# Patient Record
Sex: MALE | Race: WHITE | NOT HISPANIC OR LATINO | URBAN - METROPOLITAN AREA
[De-identification: names, ages, dates, MRNs, and addresses within clinical notes are randomized per-mention and may not be internally consistent; named-entity substitution may affect disease eponyms.]

---

## 2020-06-24 ENCOUNTER — OFFICE VISIT (OUTPATIENT)
Dept: URBAN - METROPOLITAN AREA CLINIC 113 | Facility: CLINIC | Age: 35
End: 2020-06-24

## 2020-07-25 ENCOUNTER — TELEPHONE ENCOUNTER (OUTPATIENT)
Dept: URBAN - METROPOLITAN AREA CLINIC 13 | Facility: CLINIC | Age: 35
End: 2020-07-25

## 2020-07-26 ENCOUNTER — TELEPHONE ENCOUNTER (OUTPATIENT)
Dept: URBAN - METROPOLITAN AREA CLINIC 13 | Facility: CLINIC | Age: 35
End: 2020-07-26

## 2020-07-26 RX ORDER — MINOCYCLINE HYDROCHLORIDE 100 MG/1
TAKE 1 CAPSULE DAILY WITH FOOD CAPSULE ORAL
Refills: 0 | Status: ACTIVE | COMMUNITY
Start: 2020-06-08

## 2020-07-26 RX ORDER — ACETAMINOPHEN AND CODEINE PHOSPHATE 300; 30 MG/1; MG/1
TABLET ORAL
Qty: 20 | Refills: 0 | Status: ACTIVE | COMMUNITY
Start: 2020-04-20

## 2020-07-26 RX ORDER — PROMETHAZINE HYDROCHLORIDE 25 MG/1
TAKE ONE TABLET BY MOUTH THREE TIMES DAILY AS NEEDED FOR NAUSEA TABLET ORAL
Qty: 20 | Refills: 0 | Status: ACTIVE | COMMUNITY
Start: 2019-12-19

## 2020-07-26 RX ORDER — MELOXICAM 15 MG/1
TAKE 1 TABLET DAILY AS NEEDED TABLET ORAL
Refills: 0 | Status: ACTIVE | COMMUNITY
Start: 2020-06-08

## 2020-07-26 RX ORDER — AMOXICILLIN AND CLAVULANATE POTASSIUM 875; 125 MG/1; MG/1
TABLET, FILM COATED ORAL
Qty: 14 | Refills: 0 | Status: ACTIVE | COMMUNITY
Start: 2020-03-28

## 2020-07-26 RX ORDER — BUPRENORPHINE 8 MG/1
DISSOLVE 3 TABLETS UNDER TONGUE DAILY TABLET SUBLINGUAL
Qty: 84 | Refills: 0 | Status: ACTIVE | COMMUNITY
Start: 2019-11-21

## 2020-07-26 RX ORDER — GABAPENTIN 400 MG/1
TAKE ONE CAPSULE BY MOUTH THREE TIMES DAILY CAPSULE ORAL
Qty: 84 | Refills: 0 | Status: ACTIVE | COMMUNITY
Start: 2019-07-03

## 2020-07-26 RX ORDER — GLECAPREVIR AND PIBRENTASVIR 40; 100 MG/1; MG/1
TAKE 3 TABLET DAILY TABLET, FILM COATED ORAL
Qty: 84 | Refills: 2 | Status: ACTIVE | COMMUNITY
Start: 2020-07-23

## 2020-07-26 RX ORDER — PREDNISONE 20 MG/1
TABLET ORAL
Qty: 10 | Refills: 0 | Status: ACTIVE | COMMUNITY
Start: 2019-12-19

## 2020-07-26 RX ORDER — AZITHROMYCIN DIHYDRATE 250 MG/1
TAKE 2 TABLETS BY MOUTH ON DAY 1, THEN TAKE 1 TABLET DAILY ON DAYS 2-5 TABLET, FILM COATED ORAL
Qty: 6 | Refills: 0 | Status: ACTIVE | COMMUNITY
Start: 2019-12-19

## 2020-07-26 RX ORDER — GABAPENTIN 600 MG/1
TAKE 1 TABLET TWICE DAILY TABLET, FILM COATED ORAL
Qty: 180 | Refills: 2 | Status: ACTIVE | COMMUNITY
Start: 2020-02-14

## 2020-07-26 RX ORDER — CIPROFLOXACIN 3 MG/ML
SOLUTION OPHTHALMIC
Qty: 5 | Refills: 0 | Status: ACTIVE | COMMUNITY
Start: 2020-05-11

## 2020-07-26 RX ORDER — HEPATITIS A AND HEPATITIS B (RECOMBINANT) VACCINE 720; 20 [IU]/ML; UG/ML
INJECT 1 ML OTHER 0, 2, AND 6 MONTHS INJECTION, SUSPENSION INTRAMUSCULAR
Qty: 3 | Refills: 0 | Status: ACTIVE | COMMUNITY
Start: 2020-07-28

## 2020-08-12 ENCOUNTER — TELEPHONE ENCOUNTER (OUTPATIENT)
Dept: URBAN - METROPOLITAN AREA CLINIC 113 | Facility: CLINIC | Age: 35
End: 2020-08-12

## 2020-08-12 RX ORDER — VELPATASVIR AND SOFOSBUVIR 100; 400 MG/1; MG/1
1 TABLET TABLET, FILM COATED ORAL ONCE A DAY
Qty: 90 | Refills: 0 | OUTPATIENT
Start: 2020-08-12 | End: 2020-11-09

## 2020-08-27 ENCOUNTER — LAB OUTSIDE AN ENCOUNTER (OUTPATIENT)
Dept: URBAN - METROPOLITAN AREA CLINIC 113 | Facility: CLINIC | Age: 35
End: 2020-08-27

## 2020-08-27 ENCOUNTER — TELEPHONE ENCOUNTER (OUTPATIENT)
Dept: URBAN - METROPOLITAN AREA CLINIC 113 | Facility: CLINIC | Age: 35
End: 2020-08-27

## 2020-09-08 ENCOUNTER — TELEPHONE ENCOUNTER (OUTPATIENT)
Dept: URBAN - METROPOLITAN AREA CLINIC 113 | Facility: CLINIC | Age: 35
End: 2020-09-08

## 2020-09-08 RX ORDER — HEPATITIS A AND HEPATITIS B (RECOMBINANT) VACCINE 720; 20 [IU]/ML; UG/ML
INJECT 1 ML INJECTION, SUSPENSION INTRAMUSCULAR
Qty: 3 | Refills: 0
Start: 2020-07-28 | End: 2021-04-30

## 2020-09-18 ENCOUNTER — TELEPHONE ENCOUNTER (OUTPATIENT)
Dept: URBAN - METROPOLITAN AREA CLINIC 113 | Facility: CLINIC | Age: 35
End: 2020-09-18

## 2020-09-24 ENCOUNTER — LAB OUTSIDE AN ENCOUNTER (OUTPATIENT)
Dept: URBAN - METROPOLITAN AREA CLINIC 113 | Facility: CLINIC | Age: 35
End: 2020-09-24

## 2020-09-28 ENCOUNTER — DASHBOARD ENCOUNTERS (OUTPATIENT)
Age: 35
End: 2020-09-28

## 2020-09-28 ENCOUNTER — WEB ENCOUNTER (OUTPATIENT)
Dept: URBAN - METROPOLITAN AREA CLINIC 113 | Facility: CLINIC | Age: 35
End: 2020-09-28

## 2020-09-28 ENCOUNTER — OFFICE VISIT (OUTPATIENT)
Dept: URBAN - METROPOLITAN AREA CLINIC 113 | Facility: CLINIC | Age: 35
End: 2020-09-28
Payer: SELF-PAY

## 2020-09-28 VITALS
DIASTOLIC BLOOD PRESSURE: 85 MMHG | HEART RATE: 76 BPM | HEIGHT: 75 IN | SYSTOLIC BLOOD PRESSURE: 115 MMHG | BODY MASS INDEX: 29.22 KG/M2 | RESPIRATION RATE: 20 BRPM | WEIGHT: 235 LBS | TEMPERATURE: 98.2 F

## 2020-09-28 DIAGNOSIS — R74.8 ABNORMAL AST AND ALT: ICD-10-CM

## 2020-09-28 DIAGNOSIS — B18.2 CHRONIC HEPATITIS C VIRUS GENOTYPE 3 INFECTION: ICD-10-CM

## 2020-09-28 PROBLEM — 365767001: Status: ACTIVE | Noted: 2020-09-28

## 2020-09-28 PROBLEM — 768006009: Status: ACTIVE | Noted: 2020-09-28

## 2020-09-28 PROCEDURE — 1036F TOBACCO NON-USER: CPT | Performed by: INTERNAL MEDICINE

## 2020-09-28 PROCEDURE — G8427 DOCREV CUR MEDS BY ELIG CLIN: HCPCS | Performed by: INTERNAL MEDICINE

## 2020-09-28 PROCEDURE — G9903 PT SCRN TBCO ID AS NON USER: HCPCS | Performed by: INTERNAL MEDICINE

## 2020-09-28 PROCEDURE — G8417 CALC BMI ABV UP PARAM F/U: HCPCS | Performed by: INTERNAL MEDICINE

## 2020-09-28 PROCEDURE — 99214 OFFICE O/P EST MOD 30 MIN: CPT | Performed by: INTERNAL MEDICINE

## 2020-09-28 RX ORDER — ACETAMINOPHEN AND CODEINE PHOSPHATE 300; 30 MG/1; MG/1
TABLET ORAL
Qty: 20 | Refills: 0 | Status: DISCONTINUED | COMMUNITY
Start: 2020-04-20

## 2020-09-28 RX ORDER — PREDNISONE 20 MG/1
TABLET ORAL
Qty: 10 | Refills: 0 | Status: DISCONTINUED | COMMUNITY
Start: 2019-12-19

## 2020-09-28 RX ORDER — CIPROFLOXACIN 3 MG/ML
SOLUTION OPHTHALMIC
Qty: 5 | Refills: 0 | Status: DISCONTINUED | COMMUNITY
Start: 2020-05-11

## 2020-09-28 RX ORDER — MELOXICAM 15 MG/1
TAKE 1 TABLET DAILY AS NEEDED TABLET ORAL
Refills: 0 | Status: DISCONTINUED | COMMUNITY
Start: 2020-06-08

## 2020-09-28 RX ORDER — AMOXICILLIN AND CLAVULANATE POTASSIUM 875; 125 MG/1; MG/1
TABLET, FILM COATED ORAL
Qty: 14 | Refills: 0 | Status: DISCONTINUED | COMMUNITY
Start: 2020-03-28

## 2020-09-28 RX ORDER — GLECAPREVIR AND PIBRENTASVIR 40; 100 MG/1; MG/1
TAKE 3 TABLET DAILY TABLET, FILM COATED ORAL
Qty: 84 | Refills: 2 | Status: DISCONTINUED | COMMUNITY
Start: 2020-07-23

## 2020-09-28 RX ORDER — SOFOSBUVIR, VELPATASVIR, AND VOXILAPREVIR 400; 100; 100 MG/1; MG/1; MG/1
1 TABLET WITH FOOD TABLET, FILM COATED ORAL ONCE A DAY
Status: ACTIVE | COMMUNITY

## 2020-09-28 RX ORDER — HEPATITIS A AND HEPATITIS B (RECOMBINANT) VACCINE 720; 20 [IU]/ML; UG/ML
INJECT 1 ML INJECTION, SUSPENSION INTRAMUSCULAR
Qty: 3 | Refills: 0 | Status: DISCONTINUED | COMMUNITY
Start: 2020-07-28 | End: 2021-04-30

## 2020-09-28 RX ORDER — BUPRENORPHINE 8 MG/1
DISSOLVE 3 TABLETS UNDER TONGUE DAILY TABLET SUBLINGUAL
Qty: 84 | Refills: 0 | Status: ACTIVE | COMMUNITY
Start: 2019-11-21

## 2020-09-28 RX ORDER — HYDROXYZINE HYDROCHLORIDE 25 MG/1
1 TABLET AS NEEDED TABLET, FILM COATED ORAL
Status: ACTIVE | COMMUNITY

## 2020-09-28 RX ORDER — MINOCYCLINE HYDROCHLORIDE 100 MG/1
TAKE 1 CAPSULE DAILY WITH FOOD CAPSULE ORAL
Refills: 0 | Status: DISCONTINUED | COMMUNITY
Start: 2020-06-08

## 2020-09-28 RX ORDER — VELPATASVIR AND SOFOSBUVIR 100; 400 MG/1; MG/1
1 TABLET TABLET, FILM COATED ORAL ONCE A DAY
Qty: 90 | Refills: 0 | Status: DISCONTINUED | COMMUNITY
Start: 2020-08-12 | End: 2020-11-09

## 2020-09-28 RX ORDER — PROMETHAZINE HYDROCHLORIDE 25 MG/1
TAKE ONE TABLET BY MOUTH THREE TIMES DAILY AS NEEDED FOR NAUSEA TABLET ORAL
Qty: 20 | Refills: 0 | Status: DISCONTINUED | COMMUNITY
Start: 2019-12-19

## 2020-09-28 RX ORDER — GABAPENTIN 400 MG/1
TAKE ONE CAPSULE BY MOUTH THREE TIMES DAILY CAPSULE ORAL
Qty: 84 | Refills: 0 | Status: DISCONTINUED | COMMUNITY
Start: 2019-07-03

## 2020-09-28 RX ORDER — AZITHROMYCIN DIHYDRATE 250 MG/1
TAKE 2 TABLETS BY MOUTH ON DAY 1, THEN TAKE 1 TABLET DAILY ON DAYS 2-5 TABLET, FILM COATED ORAL
Qty: 6 | Refills: 0 | Status: DISCONTINUED | COMMUNITY
Start: 2019-12-19

## 2020-09-28 RX ORDER — GABAPENTIN 600 MG/1
TAKE 1 TABLET TWICE DAILY TABLET, FILM COATED ORAL
Qty: 180 | Refills: 2 | Status: ACTIVE | COMMUNITY
Start: 2020-02-14

## 2020-09-28 NOTE — HPI-TODAY'S VISIT:
He was prescribed Vosevil for 12 weeks which he started today.  He denies any new GI complaints and is tolerating the medication so far.  He denies alcohol use, tobacco or drug use.  Abdominal ultrasound 6/30/2020 revealed a normal liver, normal gallbladder, common bile duct normal at 3 mm

## 2020-09-28 NOTE — HPI-OTHER HISTORIES
Mr. Hernández is a 34-year-old male with history of hepatitis C treatment experienced initially referred for recurrent hepatitis C. He was iniitally seen June 24th.    He was treated while incarcerated 2 years ago for around 12 weeks. They did not tell him which medications he was given, but he states he was taking 1 pill once a day for 12 weeks. He currently denies abdominal pain, change in bowel habits, jaundice, nausea, vomiting, weight loss or blood in the stool. He does have a history of IV illicit opiate use and homemade tattoos. No family history of colon cancer, inflammatory bowel disease GI cancers, peptic ulcer disease or chronic liver disease. He believes he had an ultrasound a few years ago. He quit smoking earlier this year and does not drink alcohol any longer.   Labs 6/19/20 : CBC 5.1, hemoglobin 14.6, MCV 83, platelets 190; BUN 12, creatinine 0.9, AST 51, , alkaline phosphatase 63, T bili 1.7, albumin 4.6; hepatitis C antibody reactive, hepatitis C RNA ,000 iu/ml  Labs 6/29/2020 hepatitis C genotype 3

## 2020-10-28 LAB
A/G RATIO: 1.5
ALBUMIN: 4.4
ALKALINE PHOSPHATASE: 70
AST (SGOT): 19
BASO (ABSOLUTE): 0
BASOS: 0
BILIRUBIN, TOTAL: 1.3
BUN/CREATININE RATIO: 11
BUN: 10
CALCIUM: 9.5
CARBON DIOXIDE, TOTAL: 23
CHLORIDE: 103
CREATININE: 0.89
EGFR IF AFRICN AM: 128
EGFR IF NONAFRICN AM: 111
EOS (ABSOLUTE): 0.3
EOS: 5
GLOBULIN, TOTAL: 2.9
GLUCOSE: 98
HEMATOCRIT: 40.8
HEMATOLOGY COMMENTS:: (no result)
HEMOGLOBIN: 14
IMMATURE CELLS: (no result)
IMMATURE GRANS (ABS): 0
IMMATURE GRANULOCYTES: 0
LYMPHS (ABSOLUTE): 1.9
LYMPHS: 32
MCH: 28.2
MCHC: 34.3
MCV: 82
MONOCYTES(ABSOLUTE): 0.4
MONOCYTES: 6
NEUTROPHILS (ABSOLUTE): 3.3
NEUTROPHILS: 57
NRBC: (no result)
PLATELETS: 185
POTASSIUM: 4.2
PROTEIN, TOTAL: 7.3
RBC: 4.96
RDW: 12.9
SODIUM: 140
WBC: 5.9

## 2020-11-06 ENCOUNTER — TELEPHONE ENCOUNTER (OUTPATIENT)
Dept: URBAN - METROPOLITAN AREA CLINIC 113 | Facility: CLINIC | Age: 35
End: 2020-11-06

## 2020-11-06 RX ORDER — SOFOSBUVIR, VELPATASVIR, AND VOXILAPREVIR 400; 100; 100 MG/1; MG/1; MG/1
1 TABLET WITH FOOD TABLET, FILM COATED ORAL ONCE A DAY
Qty: 90 TABLET | Refills: 0

## 2020-12-28 ENCOUNTER — LAB OUTSIDE AN ENCOUNTER (OUTPATIENT)
Dept: URBAN - METROPOLITAN AREA CLINIC 113 | Facility: CLINIC | Age: 35
End: 2020-12-28

## 2021-01-28 ENCOUNTER — OFFICE VISIT (OUTPATIENT)
Dept: URBAN - METROPOLITAN AREA CLINIC 113 | Facility: CLINIC | Age: 36
End: 2021-01-28

## 2021-02-11 ENCOUNTER — TELEPHONE ENCOUNTER (OUTPATIENT)
Dept: URBAN - METROPOLITAN AREA CLINIC 113 | Facility: CLINIC | Age: 36
End: 2021-02-11

## 2021-03-23 ENCOUNTER — OFFICE VISIT (OUTPATIENT)
Dept: URBAN - METROPOLITAN AREA CLINIC 107 | Facility: CLINIC | Age: 36
End: 2021-03-23

## 2021-04-14 ENCOUNTER — TELEPHONE ENCOUNTER (OUTPATIENT)
Dept: URBAN - METROPOLITAN AREA CLINIC 113 | Facility: CLINIC | Age: 36
End: 2021-04-14

## 2021-04-14 PROBLEM — 128302006 CHRONIC HEPATITIS C: Status: ACTIVE | Noted: 2021-04-14

## 2021-05-18 ENCOUNTER — OFFICE VISIT (OUTPATIENT)
Dept: URBAN - METROPOLITAN AREA CLINIC 107 | Facility: CLINIC | Age: 36
End: 2021-05-18

## 2021-06-10 ENCOUNTER — OFFICE VISIT (OUTPATIENT)
Dept: URBAN - METROPOLITAN AREA CLINIC 113 | Facility: CLINIC | Age: 36
End: 2021-06-10